# Patient Record
Sex: OTHER/UNKNOWN | Race: WHITE | NOT HISPANIC OR LATINO | URBAN - METROPOLITAN AREA
[De-identification: names, ages, dates, MRNs, and addresses within clinical notes are randomized per-mention and may not be internally consistent; named-entity substitution may affect disease eponyms.]

---

## 2019-01-23 ENCOUNTER — APPOINTMENT (OUTPATIENT)
Age: 79
Setting detail: DERMATOLOGY
End: 2019-01-28

## 2019-01-23 VITALS
SYSTOLIC BLOOD PRESSURE: 124 MMHG | WEIGHT: 160 LBS | HEART RATE: 67 BPM | HEIGHT: 65 IN | DIASTOLIC BLOOD PRESSURE: 64 MMHG

## 2019-01-23 DIAGNOSIS — L81.4 OTHER MELANIN HYPERPIGMENTATION: ICD-10-CM

## 2019-01-23 DIAGNOSIS — D485 NEOPLASM OF UNCERTAIN BEHAVIOR OF SKIN: ICD-10-CM

## 2019-01-23 PROBLEM — L85.3 XEROSIS CUTIS: Status: ACTIVE | Noted: 2019-01-23

## 2019-01-23 PROBLEM — D48.5 NEOPLASM OF UNCERTAIN BEHAVIOR OF SKIN: Status: ACTIVE | Noted: 2019-01-23

## 2019-01-23 PROBLEM — I10 ESSENTIAL (PRIMARY) HYPERTENSION: Status: ACTIVE | Noted: 2019-01-23

## 2019-01-23 PROCEDURE — 99203 OFFICE O/P NEW LOW 30 MIN: CPT | Mod: 25

## 2019-01-23 PROCEDURE — OTHER REASSURANCE: OTHER

## 2019-01-23 PROCEDURE — OTHER BIOPSY BY SHAVE METHOD: OTHER

## 2019-01-23 PROCEDURE — 11102 TANGNTL BX SKIN SINGLE LES: CPT

## 2019-01-23 PROCEDURE — OTHER COUNSELING: OTHER

## 2019-01-23 PROCEDURE — OTHER MIPS QUALITY: OTHER

## 2019-01-23 ASSESSMENT — LOCATION DETAILED DESCRIPTION DERM
LOCATION DETAILED: LEFT SUPERIOR CENTRAL MALAR CHEEK
LOCATION DETAILED: LEFT CENTRAL MALAR CHEEK
LOCATION DETAILED: LEFT FOREHEAD
LOCATION DETAILED: RIGHT CENTRAL MALAR CHEEK

## 2019-01-23 ASSESSMENT — LOCATION SIMPLE DESCRIPTION DERM
LOCATION SIMPLE: LEFT FOREHEAD
LOCATION SIMPLE: RIGHT CHEEK
LOCATION SIMPLE: LEFT CHEEK

## 2019-01-23 ASSESSMENT — LOCATION ZONE DERM: LOCATION ZONE: FACE

## 2019-01-23 NOTE — PROCEDURE: BIOPSY BY SHAVE METHOD
Bill For Surgical Tray: no
Wound Care: No ointment
Cryotherapy Text: The wound bed was treated with cryotherapy after the biopsy was performed.
Notification Instructions: Patient will be notified of biopsy results. However, patient instructed to call the office if not contacted within 2 weeks.
Dressing: pressure dressing
Anesthesia Volume In Cc (Will Not Render If 0): 0.2
X Size Of Lesion In Cm: 0
Anesthesia Type: 1% lidocaine without epinephrine
Electrodesiccation And Curettage Text: The wound bed was treated with electrodesiccation and curettage after the biopsy was performed.
Electrodesiccation Text: The wound bed was treated with electrodesiccation after the biopsy was performed.
Hemostasis: Drysol
Post-Care Instructions: I reviewed with the patient in detail post-care instructions. Patient is to keep the biopsy site dry overnight, and then apply bacitracin twice daily until healed. Patient may apply hydrogen peroxide soaks to remove any crusting.
Type Of Destruction Used: Curettage
Silver Nitrate Text: The wound bed was treated with silver nitrate after the biopsy was performed.
Consent: Written consent was obtained and risks were reviewed including but not limited to scarring, infection, bleeding, scabbing, incomplete removal, nerve damage and allergy to anesthesia.
Billing Type: Third-Party Bill
Depth Of Biopsy: dermis
Detail Level: Detailed
Biopsy Method: Dermablade
Was A Bandage Applied: Yes
Biopsy Type: H and E

## 2019-01-23 NOTE — PROCEDURE: MIPS QUALITY
Quality 226: Preventive Care And Screening: Tobacco Use: Screening And Cessation Intervention: Patient screened for tobacco use and is an ex/non-smoker
Quality 110: Preventive Care And Screening: Influenza Immunization: Influenza Immunization Administered during Influenza season
Quality 130: Documentation Of Current Medications In The Medical Record: Current Medications Documented
Quality 47: Advance Care Plan: Advance Care Planning discussed and documented; advance care plan or surrogate decision maker documented in the medical record.
Detail Level: Detailed

## 2019-02-27 ENCOUNTER — APPOINTMENT (OUTPATIENT)
Age: 79
Setting detail: DERMATOLOGY
End: 2019-03-04

## 2019-02-27 VITALS
SYSTOLIC BLOOD PRESSURE: 140 MMHG | HEART RATE: 62 BPM | HEIGHT: 65 IN | WEIGHT: 162 LBS | DIASTOLIC BLOOD PRESSURE: 67 MMHG

## 2019-02-27 DIAGNOSIS — L57.0 ACTINIC KERATOSIS: ICD-10-CM

## 2019-02-27 PROCEDURE — OTHER LIQUID NITROGEN: OTHER

## 2019-02-27 PROCEDURE — OTHER MIPS QUALITY: OTHER

## 2019-02-27 PROCEDURE — OTHER COUNSELING: OTHER

## 2019-02-27 PROCEDURE — 17000 DESTRUCT PREMALG LESION: CPT

## 2019-02-27 ASSESSMENT — LOCATION ZONE DERM: LOCATION ZONE: FACE

## 2019-02-27 ASSESSMENT — LOCATION DETAILED DESCRIPTION DERM: LOCATION DETAILED: LEFT CENTRAL MALAR CHEEK

## 2019-02-27 ASSESSMENT — LOCATION SIMPLE DESCRIPTION DERM: LOCATION SIMPLE: LEFT CHEEK

## 2019-02-27 NOTE — PROCEDURE: LIQUID NITROGEN
Number Of Freeze-Thaw Cycles: 3 freeze-thaw cycles
Detail Level: Detailed
Render Post-Care Instructions In Note?: no
Post-Care Instructions: I reviewed with the patient in detail post-care instructions. Patient is to wear sunprotection, and avoid picking at any of the treated lesions. Pt may apply Vaseline to crusted or scabbing areas.
Consent: The patient's consent was obtained including but not limited to risks of crusting, scabbing, blistering, scarring, darker or lighter pigmentary change, recurrence, incomplete removal and infection.
Duration Of Freeze Thaw-Cycle (Seconds): 0

## 2019-04-10 ENCOUNTER — APPOINTMENT (OUTPATIENT)
Age: 79
Setting detail: DERMATOLOGY
End: 2019-04-15

## 2019-04-10 VITALS
HEART RATE: 61 BPM | SYSTOLIC BLOOD PRESSURE: 130 MMHG | WEIGHT: 167 LBS | HEIGHT: 65 IN | DIASTOLIC BLOOD PRESSURE: 65 MMHG

## 2019-04-10 DIAGNOSIS — Z71.89 OTHER SPECIFIED COUNSELING: ICD-10-CM

## 2019-04-10 DIAGNOSIS — L57.0 ACTINIC KERATOSIS: ICD-10-CM

## 2019-04-10 PROCEDURE — OTHER COUNSELING: OTHER

## 2019-04-10 PROCEDURE — 99213 OFFICE O/P EST LOW 20 MIN: CPT

## 2019-04-10 PROCEDURE — OTHER SUNSCREEN RECOMMENDATIONS: OTHER

## 2019-04-10 PROCEDURE — OTHER MIPS QUALITY: OTHER

## 2019-04-10 ASSESSMENT — LOCATION SIMPLE DESCRIPTION DERM: LOCATION SIMPLE: LEFT CHEEK

## 2019-04-10 ASSESSMENT — LOCATION ZONE DERM: LOCATION ZONE: FACE

## 2019-04-10 ASSESSMENT — LOCATION DETAILED DESCRIPTION DERM: LOCATION DETAILED: LEFT CENTRAL MALAR CHEEK

## 2019-04-10 NOTE — PROCEDURE: SUNSCREEN RECOMMENDATIONS

## 2019-04-10 NOTE — PROCEDURE: MIPS QUALITY
Quality 130: Documentation Of Current Medications In The Medical Record: Current Medications Documented
Quality 110: Preventive Care And Screening: Influenza Immunization: Influenza Immunization Administered during Influenza season
Detail Level: Detailed
Quality 226: Preventive Care And Screening: Tobacco Use: Screening And Cessation Intervention: Patient screened for tobacco use and is an ex/non-smoker
Quality 47: Advance Care Plan: Advance Care Planning discussed and documented; advance care plan or surrogate decision maker documented in the medical record.